# Patient Record
Sex: FEMALE | Race: WHITE | URBAN - METROPOLITAN AREA
[De-identification: names, ages, dates, MRNs, and addresses within clinical notes are randomized per-mention and may not be internally consistent; named-entity substitution may affect disease eponyms.]

---

## 2017-02-02 NOTE — PATIENT DISCUSSION
8 17 16 NO SIG VASCULITIS OTHER THAN SMALL NEW CWS ALONG INF ARCADE OD, PT ^ PREDNISONE AS RHEUMATOLOGIST SAID INFLAATION LEVEL WENT UP.

## 2017-02-02 NOTE — PATIENT DISCUSSION
6 15 16 MILD VIT INFLAMATION OS BUT NO VITRITIOUS. ON METHOTREXATE. HAS BEEN STABLE.   REDUCE PREDNISONE TO 5 EVERY OTHER DAY AND CONSIDER STOPPING IF DOING OK ON FU

## 2017-02-02 NOTE — PATIENT DISCUSSION
NO ACTIVE VASCULITIS SEEN TODAY - CONT VS CONT METHOTREXATE AND PREDNISONE - IN PAST HAS RECURRED WHEN MEDS WERE REDUCED - DISCUSSED F/U WITH DR GILLIS AND PT TO CONSIDER

## 2017-02-02 NOTE — PATIENT DISCUSSION
DAN-Ref: Within NEXT AVAILABLE , Phone # [phone] ), Secondary Phone # ( )-( )-( ), Type of referral: NEW PATIENT , Comments:  CHANGES WITH HER GLAUCOMA. End DAN-Ref.

## 2017-02-02 NOTE — PATIENT DISCUSSION
MAY BE 2ND AMD/RETINAL CHANGES.   RECOM F/U DR Nelsy Garay TO SEE IF A CHANGE IN GLASSES WOULD HELP - HAS NOT SEEN

## 2017-05-02 NOTE — PATIENT DISCUSSION
6 15 16 MILD VIT INFLAMATION OS BUT NO VITRITIOUS. ON METHOTREXATE. HAS BEEN STABLE. REDUCE PREDNISONE TO 5 EVERY OTHER DAY AND CONSIDER STOPPING IF DOING OK ON FU.

## 2017-05-02 NOTE — PATIENT DISCUSSION
MAY BE 2ND AMD/RETINAL CHANGES. RECOM F/U DR Bennie Torrez TO SEE IF A CHANGE IN GLASSES WOULD HELP - HAS NOT SEEN.

## 2017-05-02 NOTE — PATIENT DISCUSSION
DAN-Ref: Within NEXT AVAILABLE , Phone # [phone] ), Secondary Phone # ( )-( )-( ), Type of referral: NEW PATIENT , Comments:  CHANGES WITH HER GLAUCOMA. End DAN-Ref.

## 2017-05-02 NOTE — PATIENT DISCUSSION
NO ACTIVE VASCULITIS SEEN - NO UVEITIS - NO CELLS  - CONT METHOTREXATE AND PREDNISONE - IN PAST HAS RECURRED WHEN MEDS WERE REDUCED - DISCUSSED F/U WITH DR GILLIS AND PT TO CONSIDER.

## 2017-06-08 ENCOUNTER — IMPORTED ENCOUNTER (OUTPATIENT)
Dept: URBAN - METROPOLITAN AREA CLINIC 50 | Facility: CLINIC | Age: 82
End: 2017-06-08

## 2017-06-12 ENCOUNTER — IMPORTED ENCOUNTER (OUTPATIENT)
Dept: URBAN - METROPOLITAN AREA CLINIC 50 | Facility: CLINIC | Age: 82
End: 2017-06-12

## 2017-06-14 ENCOUNTER — IMPORTED ENCOUNTER (OUTPATIENT)
Dept: URBAN - METROPOLITAN AREA CLINIC 50 | Facility: CLINIC | Age: 82
End: 2017-06-14

## 2017-07-05 ENCOUNTER — IMPORTED ENCOUNTER (OUTPATIENT)
Dept: URBAN - METROPOLITAN AREA CLINIC 50 | Facility: CLINIC | Age: 82
End: 2017-07-05

## 2017-08-02 ENCOUNTER — IMPORTED ENCOUNTER (OUTPATIENT)
Dept: URBAN - METROPOLITAN AREA CLINIC 50 | Facility: CLINIC | Age: 82
End: 2017-08-02

## 2017-08-08 NOTE — PATIENT DISCUSSION
MAY BE 2ND AMD/RETINAL CHANGES. RECOM F/U DR Kayleen Cagle TO SEE IF A CHANGE IN GLASSES WOULD HELP - HAS NOT SEEN.

## 2017-08-08 NOTE — PATIENT DISCUSSION
DAN-Ref: Within NEXT AVAILABLE , Phone # [phone] ), Secondary Phone # ( )-( )-( ), Type of referral: NEW PATIENT , Comments:  CHANGES WITH HER GLAUCOMA. End DAN-Ref.

## 2017-09-18 ENCOUNTER — IMPORTED ENCOUNTER (OUTPATIENT)
Dept: URBAN - METROPOLITAN AREA CLINIC 50 | Facility: CLINIC | Age: 82
End: 2017-09-18

## 2017-11-07 NOTE — PATIENT DISCUSSION
MAY BE 2ND AMD/RETINAL CHANGES. RECOM F/U DR Hilton Erazo TO SEE IF A CHANGE IN GLASSES WOULD HELP - HAS NOT SEEN.

## 2017-11-07 NOTE — PATIENT DISCUSSION
DAN-Ref: Within NEXT AVAILABLE , Phone # [phone] ), Secondary Phone # ( )-( )-( ), Type of referral: NEW PATIENT , Comments:  CHANGES WITH HER GLAUCOMA. End DAN-Ref.

## 2017-12-05 NOTE — PATIENT DISCUSSION
MAY BE 2ND AMD/RETINAL CHANGES. RECOM F/U DR Noble Ruiz TO SEE IF A CHANGE IN GLASSES WOULD HELP - HAS NOT SEEN.

## 2017-12-05 NOTE — PATIENT DISCUSSION
DAN-Ref: Within NEXT AVAILABLE , Phone # [phone] ), Secondary Phone # ( )-( )-( ), Type of referral: NEW PATIENT , Comments:  CHANGES WITH HER GLAUCOMA. End DAN-Ref.

## 2018-02-06 NOTE — PATIENT DISCUSSION
HOWEVER CAT IS PROM AND I THINK THE POTENTIAL BENEFITS AT THIS POINT OUTWEIGH THE RISK AS IT IS GETTING WORSE -.

## 2018-02-06 NOTE — PATIENT DISCUSSION
DAN-Ref: Within NEXT AVAILABLE , Phone # [phone] ), Secondary Phone # ( )-( )-( ), Type of referral: NEW PATIENT , Comments:  CHANGES WITH HER GLAUCOMA. End DAN-Ref.

## 2018-02-06 NOTE — PATIENT DISCUSSION
Cataract APPEARS VISUALLY SIGNIFICANT and patient advised to SEE DR LY for evaluation and treatment.  WOULD RECOM STARTING PF/NON STEROIDALS 3 DAYS PRIOR TO CAT SURGERY TO TRY AND REDUCE POST OP INFLAMMATION AND TO HELP STABILIZE RETINA.

## 2018-02-06 NOTE — PATIENT DISCUSSION
MAY BE 2ND AMD/RETINAL CHANGES. RECOM F/U DR Cornelia Sam TO SEE IF A CHANGE IN GLASSES WOULD HELP - HAS NOT SEEN.

## 2018-03-12 ENCOUNTER — IMPORTED ENCOUNTER (OUTPATIENT)
Dept: URBAN - METROPOLITAN AREA CLINIC 50 | Facility: CLINIC | Age: 83
End: 2018-03-12

## 2018-05-08 NOTE — PATIENT DISCUSSION
DAN-Ref: Within NEXT AVAILABLE , Phone # [phone] ), Secondary Phone # ( )-( )-( ), Type of referral: NEW PATIENT , Comments:  CHANGES WITH HER GLAUCOMA. End DAN-Ref.

## 2018-08-20 NOTE — PATIENT DISCUSSION
DAN-Ref: Within NEXT AVAILABLE , Phone # [phone] ), Secondary Phone # ( )-( )-( ), Type of referral: NEW PATIENT , Comments:  CHANGES WITH HER GLAUCOMA. End DAN-Ref.

## 2018-10-22 ENCOUNTER — IMPORTED ENCOUNTER (OUTPATIENT)
Dept: URBAN - METROPOLITAN AREA CLINIC 50 | Facility: CLINIC | Age: 83
End: 2018-10-22

## 2018-12-05 NOTE — PATIENT DISCUSSION
DAN-Ref: Within NEXT AVAILABLE , Phone # [phone]-([phone] ), Secondary Phone # ( )-( )-( ), Type of referral: NEW PATIENT , Comments:  CHANGES WITH HER GLAUCOMA. End DAN-Ref.

## 2019-03-06 NOTE — PATIENT DISCUSSION
PT WENT TO SEE DR GILLIS - BUT THERE WAS A PROBLEM AND SHE DID NOT SEE DR GILLIS - PT SAW DR GILLIS FELLOW.

## 2019-03-28 NOTE — PATIENT DISCUSSION
DAN-Ref: Within NEXT AVAILABLE , Phone # [phone]-([phone] ), Secondary Phone # ( )-( )-( ), Type of referral: NEW PATIENT , Comments:  CHANGES WITH HER GLAUCOMA. End DAN-Ref. He has risk factors.- he needs a stress echo but his achilles tendon is bad and this secondary to Cipro     The 10-year ASCVD risk score (Hao HASTINGS Jr., et al., 2013) is: 43.8%    Values used to calculate the score:      Age: 72 years      Sex: Male      Is Non- : No      Diabetic: Yes      Tobacco smoker: No      Systolic Blood Pressure: 127 mmHg      Is BP treated: Yes      HDL Cholesterol: 30 mg/dL      Total Cholesterol: 159 mg/dL

## 2019-07-01 NOTE — PATIENT DISCUSSION
DAN-Ref: Within NEXT AVAILABLE , Phone # [phone]-([phone] ), Secondary Phone # ( )-( )-( ), Type of referral: NEW PATIENT , Comments:  CHANGES WITH HER GLAUCOMA. End DAN-Ref.

## 2019-10-07 NOTE — PATIENT DISCUSSION
NO ACTIVE INFLAMMATION. bilateral upper extremity ROM was WFL (within functional limits)/bilateral lower extremity ROM was WFL (within functional limits)

## 2019-10-08 NOTE — PATIENT DISCUSSION
DAN-Ref: Within NEXT AVAILABLE , Phone # [phone]-([phone] ), Secondary Phone # ( )-( )-( ), Type of referral: NEW PATIENT , Comments:  CHANGES WITH HER GLAUCOMA. End DAN-Ref.

## 2020-01-07 NOTE — PATIENT DISCUSSION
DAN-Ref: Within NEXT AVAILABLE , Phone # [phone]-([phone] ), Secondary Phone # ( )-( )-( ), Type of referral: NEW PATIENT , Comments:  CHANGES WITH HER GLAUCOMA. End DAN-Ref.

## 2020-07-13 NOTE — PATIENT DISCUSSION
DAN-Ref: Within NEXT AVAILABLE , Phone # [phone]-([phone] ), Secondary Phone # ( )-( )-( ), Type of referral: NEW PATIENT , Comments:  CHANGES WITH HER GLAUCOMA. End DAN-Ref.

## 2020-07-13 NOTE — PATIENT DISCUSSION
NO VASCULITIS SEEN - TO SEE DR Kwesi Larios IN 1-2 WEEKS FOR EVALUATION AND TO SEE IF SHE CAN STOP HER PREDNISONE - AND FOR HER ONGOING F/U AND CARE.

## 2020-07-13 NOTE — PATIENT DISCUSSION
STOPPED MTX SUMMER 2018 - ON PREDNISIONE - BP TOLD HER TO GO 4 MG - CONT 4 MG TILL SHE SEED DR Ruthie Henriquez.

## 2020-07-29 NOTE — PATIENT DISCUSSION
DAN-Ref: Within NEXT AVAILABLE , Phone # [phone]-([phone] ), Secondary Phone # ( )-( )-( ), Type of referral: NEW PATIENT , Comments:  CHANGES WITH HER GLAUCOMA. End DAN-Ref.

## 2020-07-29 NOTE — PATIENT DISCUSSION
NO VASCULITIS SEEN - TO SEE DR Any Fairbanks IN 1-2 WEEKS FOR EVALUATION AND TO SEE IF SHE CAN STOP HER PREDNISONE - AND FOR HER ONGOING F/U AND CARE.

## 2020-07-29 NOTE — PATIENT DISCUSSION
7/29/20: FIRST TIME NOTICED AFTER THE VISIT IN 2/18. UNCHANGED SINCE THEN ON FA AND CLINICAL FP. NO SIGNS OF ACTIVITY. LIKELY RELATED TO RA AND/OR POSTERIOR SCLERITIS 2ND TO RA. WILL DO INDOMETHACIN 75 MG BID AND REASSESS IN 4 WKS WITH ICG TO EVAL CHOROID.

## 2020-07-29 NOTE — PATIENT DISCUSSION
STOPPED MTX SUMMER 2018 - ON PREDNISIONE - BP TOLD HER TO GO 4 MG - CONT 4 MG TILL SHE SEED DR Jen Abdalla.

## 2020-08-26 NOTE — PATIENT DISCUSSION
8/26/20: VISION DROPPED. TO SEE DR TOVAR WITH HVF TO EVAL IF VISION IS RELATED TO GLACUOMA PROGRESSION?

## 2020-08-26 NOTE — PATIENT DISCUSSION
STOPPED MTX SUMMER 2018 - ON PREDNISIONE - BP TOLD HER TO GO 4 MG - CONT 4 MG TILL SHE SEED DR Vickie Ansari.

## 2020-08-26 NOTE — PATIENT DISCUSSION
DAN-Ref: Within NEXT AVAILABLE , Phone # [phone]-([phone] ), Secondary Phone # ( )-( )-( ), Type of referral: NEW PATIENT , Comments:  CHANGES WITH HER GLAUCOMA. End DAN-Ref.

## 2020-08-26 NOTE — PATIENT DISCUSSION
RECURRENT OFF PF 11 7 17 RESUME PF QID X 2 WKS THEN REDUCE TO TID AND ADD PROLENSA QD. Bill For Surgical Tray: no Lab: [id number] [facility] [address] Notification Instructions: Patient will be notified of biopsy results. However, patient instructed to call the office if not contacted within 2 weeks. Curettage Text: The wound bed was treated with curettage after the biopsy was performed. Hemostasis: Electrocautery Biopsy Method: Personna blade Detail Level: Detailed Additional Anesthesia Volume In Cc (Will Not Render If 0): 0 Wound Care: Vaseline Cryotherapy Text: The wound bed was treated with cryotherapy after the biopsy was performed. Consent: The provider's intent is to obtain a tissue sample solely for diagnostic purposes. Written consent to obtain tissue sample was obtained and risks were reviewed including but not limited to scarring, infection, bleeding, scabbing, incomplete removal, nerve damage and allergy to anesthesia. Anesthesia Type: 1% lidocaine without epinephrine Lab Facility: [id number] Anesthesia Volume In Cc (Will Not Render If 0): 0.5 Electrodesiccation Text: The wound bed was treated with electrodesiccation after the biopsy was performed. Depth Of Biopsy: dermis Electrodesiccation And Curettage Text: The wound bed was treated with electrodesiccation and curettage after the biopsy was performed. Billing Type: [employer] Type Of Destruction Used: Curettage Biopsy Type: H and E Body Location Override (Optional - Billing Will Still Be Based On Selected Body Map Location If Applicable): posterior neck Was A Bandage Applied: Yes Silver Nitrate Text: The wound bed was treated with silver nitrate after the biopsy was performed. Post-Care Instructions: I reviewed with the patient in detail post-care instructions. Patient is to keep the biopsy site dry overnight, and then apply bacitracin twice daily until healed. Patient may apply hydrogen peroxide soaks to remove any crusting. Dressing: bandage

## 2020-08-26 NOTE — PATIENT DISCUSSION
NO VASCULITIS SEEN - TO SEE DR Francesca Wagner IN 1-2 WEEKS FOR EVALUATION AND TO SEE IF SHE CAN STOP HER PREDNISONE - AND FOR HER ONGOING F/U AND CARE.

## 2020-08-26 NOTE — PATIENT DISCUSSION
8/26/20Alberteen Presto SINCE 2018. Persistent THICKENING OF CHOROID, POSSIBLE RELATIONSHIP WITH HYPOTONY AFTER TRAB. CHRONICITY MAKES IMPROVEMENTE LESS LIKELY, BUT POSS. NO SIGNS OF SCLERITIS SEEN TODAY, HOWEVER GIVEN PERSISTENT THCIKENING, WILL DO STEROIDS TO EVAL IF ANY IMPROVEMENT IS SEEN.

## 2020-09-23 NOTE — PATIENT DISCUSSION
STOPPED MTX SUMMER 2018 - ON PREDNISIONE - BP TOLD HER TO GO 4 MG - CONT 4 MG TILL SHE SEED DR Deana Flood.

## 2020-09-23 NOTE — PATIENT DISCUSSION
Call to dad, ok per patient to speak with. IUD inserted 1/8 patient having cramping and spotting after insertion and wants to know if it is normal and what to do for it.  Discussed irregular spotting and cramps for up to 6 months after insertion. Patient to be see for IUD check 1 month after insertion.    Recommended weight and BMI control through healthy diet and exercise, green leafy veggies, UV protection, and not smoking. Reviewed the benefits of AREDS II VITAMINS VERUS GENOTYPE directed vitamin therapy, and recommended following one or the other to try and prevent the progression of the disease. Reviewed the importance of daily monitoring of the vision in each eye independently, along with the use of the Amsler grid daily and instructed patient to call and return immediately for any new changes in their vision or on the Amsler grid. 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

## 2020-09-23 NOTE — PATIENT DISCUSSION
9/23/20: [facility] IMPROVED WITH PREDNISONE PO. WILL START TAPERING TO 40MG/D UNTIL NEXT APPT IN 1MO. IF OK, WILL CONT TAPERING 10MG/2WKS.

## 2020-09-23 NOTE — PATIENT DISCUSSION
8/26/20Jaden Spaulding SINCE 2018. Persistent THICKENING OF CHOROID, POSSIBLE RELATIONSHIP WITH HYPOTONY AFTER TRAB. CHRONICITY MAKES IMPROVEMENTE LESS LIKELY, BUT POSS. NO SIGNS OF SCLERITIS SEEN TODAY, HOWEVER GIVEN PERSISTENT THCIKENING, WILL DO STEROIDS TO EVAL IF ANY IMPROVEMENT IS SEEN.

## 2020-09-23 NOTE — PATIENT DISCUSSION
NO VASCULITIS SEEN - TO SEE DR Medina Ricci IN 1-2 WEEKS FOR EVALUATION AND TO SEE IF SHE CAN STOP HER PREDNISONE - AND FOR HER ONGOING F/U AND CARE.

## 2020-09-23 NOTE — PATIENT DISCUSSION
DAN-Ref: Within NEXT AVAILABLE , Phone # [phone]-([phone] ), Secondary Phone # ( )-( )-( ), Type of referral: NEW PATIENT , Comments:  CHANGES WITH HER GLAUCOMA. End DAN-Ref.

## 2020-10-08 NOTE — PATIENT DISCUSSION
NEW 1 7 20 WITH CHOROIDAL THICKENING ON B SCAN. October 8, 2020      Guy J. Lefort, MD  [address] [facility] [address]           [facility]  [address], [place]   [facility] [address]  Phone: [phone]  Fax: [phone]          Patient: Josefina Martin   MR Number: [id number]   YOB: 1974   Date of Visit: 10/8/2020       Dear Dr. Guy J. Lefort:    Thank you for referring Josefina Martin to me for evaluation. Attached you will find relevant portions of my assessment and plan of care.    If you have questions, please do not hesitate to call me. I look forward to following Josefina Martin along with you.    Sincerely,    Lucas Worthington, JUN    Enclosure  CC:  No Recipients    If you would like to receive this communication electronically, please contact [email or web address] or [phone] to request more information on [employer] Link access.    For providers and/or their staff who would like to refer a patient to [facility], please contact us through our one-stop-shop provider referral line, [facility], at [phone].    If you feel you have received this communication in error or would no longer like to receive these types of communications, please e-mail [email or web address]

## 2020-10-28 NOTE — PATIENT DISCUSSION
NO VASCULITIS SEEN - TO SEE DR Erwin Weinstein IN 1-2 WEEKS FOR EVALUATION AND TO SEE IF SHE CAN STOP HER PREDNISONE - AND FOR HER ONGOING F/U AND CARE.

## 2020-10-28 NOTE — PATIENT DISCUSSION
STOPPED MTX SUMMER 2018 - ON PREDNISIONE - BP TOLD HER TO GO 4 MG - CONT 4 MG TILL SHE SEED DR Erwin Weinstein.

## 2020-10-28 NOTE — PATIENT DISCUSSION
10/28/20: STABLE VA BUT NO IMPROVED. NO INFLAMMATION SEEN TODAY. WILL CONTINUE TAPERING PREDNISONE AS PLANNED. CONT PF QD FOR ANOTHER MONTH. DEPENDING ON HVF, WE MAY INJECT AVASTIN TO EVAL RESPONSE.

## 2020-10-28 NOTE — PATIENT DISCUSSION
My findings and recommendations are based on the patient's symptoms, exam, diagnostic testing and records. [facility]  Progress Note    Patient: Annabel Cheadle MRN: [id number]   SSN: [id number]  YOB: 1959   Age: [address] y.o. Sex: female      Admit Date: 8/7/2020    LOS: 4 days   Chief Complaint   Patient presents with    Other     Covid positive    Shortness of Breath       Subjective:     No acute events overnight. This morning she says she is fine, had a restful night's sleep on BiPAP. She continues to cough, but hasn't produced any mucus yesterday. On 2-3L O2, denies any SOB at rest. Continues to have the same chest tightness/'soreness' wrapping around her chest worse on the R flank. She thinks it is because her pleura is irritated from coughing too hard. Walked to the bathroom and back multiple times yesterday, felt lightheaded and SOB but that has been her baseline. BM yesterday, normal.     She denied any chest pain, palpitations, fever/chills, sore throat, abdominal pain. She thinks her breathing is back to her baseline now. ROS as noted above    Objective:     Visit Vitals  /74   Pulse 91   Temp 97.6 °F (36.4 °C)   Resp 26   Ht 5' 3\" (1.6 m)   Wt 121.1 kg (267 lb)   SpO2 93%   BMI 47.30 kg/m²       Physical Exam:  General: chronically ill-appearnig, NAD, alert, friendly and communicative  HEENT: EOMI, mouth moist and pink  CV:  RRR, no M/G/R  RESP: Non labored breathing on BiPAP. Decreased BS diffusely, diffuse expiratory wheezes heard bilaterally about the same as yesterday. ABD:  Soft. umbilical hernia TTP but reducible, Normoactive bowel sounds. MS:  No joint deformity or instability. Neuro: A+Ox3. Ext:  Trace edema in the LE bilaterally. 2+ radial and dp pulses bilaterally. Sensation intact in the LE bilaterally  Skin: warm, dry   Psych: normal mood and affect       Intake and Output:  Current Shift: No intake/output data recorded.   Last three shifts: 08/09 1901 - 08/11 0700  In: 500 [I.V.:500]  Out: 300 [Urine:300]    Lab/Data Review:  Recent Results (from the past 12 hour(s))   GLUCOSE, POC    Collection Time: 08/10/20  9:38 PM   Result Value Ref Range    Glucose (POC) 208 (H) 70 - 110 mg/dL   CBC WITH AUTOMATED DIFF    Collection Time: 08/11/20  4:33 AM   Result Value Ref Range    WBC 9.0 4.6 - 13.2 K/uL    RBC 3.86 (L) 4.20 - 5.30 M/uL    HGB 11.8 (L) 12.0 - 16.0 g/dL    HCT 36.1 35.0 - 45.0 %    MCV 93.5 74.0 - 97.0 FL    MCH 30.6 24.0 - 34.0 PG    MCHC 32.7 31.0 - 37.0 g/dL    RDW 14.2 11.6 - 14.5 %    PLATELET 555 791 - 615 K/uL    MPV 9.2 9.2 - 11.8 FL    NEUTROPHILS 66 40 - 73 %    LYMPHOCYTES 26 21 - 52 %    MONOCYTES 8 3 - 10 %    EOSINOPHILS 0 0 - 5 %    BASOPHILS 0 0 - 2 %    ABS. NEUTROPHILS 5.9 1.8 - 8.0 K/UL    ABS. LYMPHOCYTES 2.4 0.9 - 3.6 K/UL    ABS. MONOCYTES 0.8 0.05 - 1.2 K/UL    ABS. EOSINOPHILS 0.0 0.0 - 0.4 K/UL    ABS. BASOPHILS 0.0 0.0 - 0.1 K/UL    DF AUTOMATED     LACTIC ACID    Collection Time: 08/11/20  4:33 AM   Result Value Ref Range    Lactic acid 1.6 0.4 - 2.0 MMOL/L   METABOLIC PANEL, BASIC    Collection Time: 08/11/20  4:33 AM   Result Value Ref Range    Sodium 138 136 - 145 mmol/L    Potassium 3.4 (L) 3.5 - 5.5 mmol/L    Chloride 99 (L) 100 - 111 mmol/L    CO2 34 (H) 21 - 32 mmol/L    Anion gap 5 3.0 - 18 mmol/L    Glucose 92 74 - 99 mg/dL    BUN 22 (H) 7.0 - 18 MG/DL    Creatinine 0.93 0.6 - 1.3 MG/DL    BUN/Creatinine ratio 24 (H) 12 - 20      GFR est AA >60 >60 ml/min/1.73m2    GFR est non-AA >60 >60 ml/min/1.73m2    Calcium 10.1 8.5 - 10.1 MG/DL       RECENT RESULTS  MODALITY IMPRESSION   XR Results from [place] encounter on 08/07/20   XR CHEST PORT    Narrative EXAM: XR CHEST PORT    INDICATION: 61 years Female. sob/covid. ADDITIONAL HISTORY: None. TECHNIQUE: Frontal view of the chest.    COMPARISON: 7/27/2020    FINDINGS:    The cardiac silhouette is within normal limits in size.     There is linear opacities in the peripheral aspect of the bilateral mid and  lower lung zones which are less pronounced than prior. No definite evidence of pleural effusion. No evidence of pneumothorax. Tethering  of the left lateral hemidiaphragm. Osseous structures are unchanged in appearance. Impression IMPRESSION:  Interval improved linear peripheral opacities in the bilateral mid and lower  lung zones, suggesting of scarring although persistent infiltrates are not  excluded. Recommend continued imaging follow-up. CT Results from [place] encounter on 07/30/20   CT ABD PELV WO CONT    Addendum Addendum: Addendum:  As mentioned in the initial report but not in impression are extensive peripheral reticular and fibrotic opacities in imaged bilateral lower lung  new since the CT in 4/20 and concerning subacute interstitial pneumonitis. Clinical correlation and chest CT evaluation advised. Lebron Ellis MD 7/30/2020 10:38 AM          Narrative CT of abdomen and pelvis without contrast     INDICATION: Increasing pain at the ventral hernia    COMPARISON: CT 4/8/20    TECHNIQUE: 5 mm helical scan to the abdomen and pelvis is obtained  from the  diaphragm to the symphysis pubis without  IV contrast administration. All CT scans at this facility performed using dose optimization techniques as  appreciated to a performed exam, to include automated exposure control,  adjustment of the mA and or KU according to patient size (including appropriate  matching for site specific examination), or use of iterative reconstruction  technique. FINDINGS: The study is suboptimal due to lack of IV contrast.  Subtle  abnormality can be under detected. Lung Bases: Moderate streaky and reticular opacities identified in imaged  bilateral lower lobes, new since the prior study. Atelectasis in right middle  lobe along the fissure. Prominent pericardial fat pad. Liver: Normal.    Gallbladder: Surgically absent. Biliary System: No ductal dilatation.     Spleen: Normal.    Pancreas: Normal.    Adrenal Glands: Normal.    Kidneys: Normal.    Bowel: The stomach is filled with food content within no definite abnormality. The small bowel is mildly dilated with air-fluid levels as well as mild mucosal  thickening, especially in the jejunum. The dilatation is extending to the  terminal ileum with no definite transit point. The colon is nondilated with  scattered diverticula throughout. No diverticulitis. The periumbilical hernia is slightly decreased in size from 6.7 x 7.2 cm to 5.8  x 6.3 cm. The small bowel loop within the hernia sac is no longer evident  instead is abutting the hernia. Although, the mesenteric fat within the hernia  sac appears inflamed. Mild hernia wall thickening and minimal fluid appear  unchanged. Lower genitourinary system: The bladder is poorly distended. The uterus is  surgically absent. Peritoneum/Retroperitoneum: No adenopathy. Vasculature: Moderate atherosclerotic aortic disease. Other: No free fluid. CT OSSEOUS STRUCTURES:       Unremarkable for age. Impression IMPRESSION:     1. The ventral hernia is slightly smaller and the small bowel loop is no longer  seen in the hernia sac but abutting the hernia neck compared to the prior CT in  4/20. The mesenteric within the hernia sac becomes strained, suggesting fatty  inflammation/ischemia. 2. Interval development of mild small bowel dilatation with air-fluid level and  mild mural thickening all the way to the terminal ileum with no evidence of  transit point. The finding could represent enteritis or adynamic ileus. Recommend clinical correlation. 3. Diverticulosis coli. Thank you for this referral.       MRI No results found for this or any previous visit. ULTRASOUND Results from [place] encounter on 04/10/17   US ABD COMP    Impression IMPRESSION:       1. Echogenic mildly enlarged liver, suggestive of hepatic steatosis. No focal  hepatic lesion identified. 2. Status post cholecystectomy.  No biliary ductal dilatation. Cardiology Procedures/Testing:  MODALITY RESULTS   EKG Results for orders placed or performed during the hospital encounter of 08/07/20   EKG, 12 LEAD, INITIAL   Result Value Ref Range    Ventricular Rate 80 BPM    Atrial Rate 80 BPM    P-R Interval 142 ms    QRS Duration 90 ms    Q-T Interval 378 ms    QTC Calculation (Bezet) 435 ms    Calculated P Axis 115 degrees    Calculated T Axis 108 degrees    Diagnosis       Sinus rhythm with premature atrial complexes  Low voltage QRS  Cannot rule out Anterior infarct (cited on or before 22-JUL-2020)  Nonspecific T wave abnormality  Abnormal ECG  When compared with ECG of 07-AUG-2020 15:24,  premature atrial complexes are now present  Confirmed by Gwen Chin ([id number]) on 8/9/2020 10:00:13 AM         ECHO 05/21/20   ECHO ADULT COMPLETE 05/25/2020 5/25/2020    Narrative · Image quality for this study was technically difficult. Contrast used:   DEFINITY. · Normal cavity size and systolic function (ejection fraction normal). Moderate concentric hypertrophy. Estimated left ventricular ejection   fraction is 65 - 70%. Visually measured ejection fraction. No regional   wall motion abnormality noted. Moderate (grade 2) left ventricular   diastolic dysfunction. · Mildly dilated left atrium. · Pulmonary hypertension. Pulmonary arterial systolic pressure is 61 mmHg. · Severely elevated central venous pressure (15+ mmHg); IVC diameter is   larger than 21 mm and collapses less than 50% with respiration.         Signed by: Jayant Caicedo MD        Special Testing/Procedures:  MODALITY RESULTS   MICRO All Micro Results     Procedure Component Value Units Date/Time    CULTURE, BLOOD [[id number]] Collected:  08/07/20 1525    Order Status:  Completed Specimen:  Blood Updated:  08/11/20 0727     Special Requests: NO SPECIAL REQUESTS        Culture result: NO GROWTH 4 DAYS       CULTURE, BLOOD [[id number]] Collected:  08/07/20 1510    Order Status:  Completed Specimen:  Blood Updated:  08/11/20 0727     Special Requests: NO SPECIAL REQUESTS        Culture result: NO GROWTH 4 DAYS       CULTURE, RESPIRATORY/SPUTUM/BRONCH Bossman Ramirez [[id number]]     Order Status:  Sent Specimen:  Sputum     CULTURE, RESPIRATORY/SPUTUM/BRONCH Bossman Ramirez [[id number]] Collected:  08/07/20 2115    Order Status:  Canceled Specimen:  Sputum          UA No results found for this or any previous visit. Telemetry yes   Oxygen NC/BiPAP     Assessment and Plan:     61 y.o. female with PMH of COPD on home O2 (3L NC), IDDM2, HTN, HFpEF, SHERRIE on CPAP, GERD, allergic rhinitis, recent prior admission for COVID pneumonia coupled with acute on chronic hypoxic respiratory failure, now admitted with worsening dyspnea.     Acute COPD Exacerbation/Asthma overlap syndrome. Improving, at baseline. Pt w/ hx of positive COVID swab (7/7, 7/22), but negative on admission (8/7). S/p Remdesivir and Convalescnet plasma. Patient has COPD on max therapy w/ strong asthma component. Hospitalized 6x in 2020 for COPD exacerbations. Possibly had a superimposed lung infection given elevated lactic acid, green sputum. No leukocytosis, although neutrophils 90%. Pending sputum culture, is on Levaquin, Zosyn. Continues to have chest tightness, but no cardiac concerns as troponins were negative and BNP is at baseline.  CXR 8/7 showed interval improved linear peripheral opacities in the bilateral mid and lower lung zones.   - Pulm following, appreciate recs  - Transfer to telemetry  - Sputum Culture  - Rapid COVID test was negative today, also was negative 8/7. Will discontinue droplet precautions. - Change Levaquin 750mg IV to PO.  - discontinue zosyn  - Prednisone PO.  Taper plan per pulm: 7 day course per Pulm rec 40qd x 7 days, 30qdx7 days, 20qd x 7 days, then 10qd x7 days  - duonebs q4 hours   - continue Advair and spiriva  - albuterol prn   - hold home bronchodilators   - hold home azithromycin M, W, F   - Daily CBC, BMP   - Lovenox 40 mg for DVT prophylaxis BID  - Supplemental oxygen with goal of 88-92%  - Vital signs per unit routine  - Monitor I/Os  - PT/OT already evaluated,appreciate input . Continue working with CM     Insulin dependent Type 2 Diabetes  Home lantus 45u pm and novolog SSI  -Continue Lantus at 55 U; BG has been in 200-280s, but steroids also contribute to hyperglycemia  -SSI   -Accuchecks ACHS  -Diabetic diet  -Diabetic educator seen yesterday, appreciate recs.     Hypertension, HFpEF: ECHO (5/24/20) TQ 30 - 70%. No regional wall motion abnormality. Moderate (grade 2) left ventricular diastolic dysfunction. Mildly dilated left atrium. Pulmonary hypertension. Pulmonary arterial systolic pressure is 61 mmHg. Severely elevated central venous pressure (15+ mmHg); IVC diameter is larger than 21 mm and collapses less than 50% with respiration. SBP in ED elevated  to 129s-139s.  Pt on lisinopril 20mg BID and HCTZ 12.5mg daily at home.  - Continue Lasix 20mg daily  - Continue Lisinopril 20mg BID  - Continue HCTZ 12.5 mg daily     GERD: Currently asymptomatic   -Continue omeprazole 40mg daily     Morbid obesity  -Diabetic diet       Diet diabetic   DVT Prophylaxis lovenox   GI Prophylaxis omeprazole   Code status Full   Disposition Home with [address] of Contact Nelly Bah  Relationship: Daughter  [phone]     Samira Goodman MD, PGY1   [address]   Intern Pager: [phone]   August 11, 2020, 11:15 AM

## 2020-10-28 NOTE — PATIENT DISCUSSION
8/26/20Clover Siegel SINCE 2018. Persistent THICKENING OF CHOROID, POSSIBLE RELATIONSHIP WITH HYPOTONY AFTER TRAB. CHRONICITY MAKES IMPROVEMENTE LESS LIKELY, BUT POSS. NO SIGNS OF SCLERITIS SEEN TODAY, HOWEVER GIVEN PERSISTENT THCIKENING, WILL DO STEROIDS TO EVAL IF ANY IMPROVEMENT IS SEEN.

## 2020-10-28 NOTE — PATIENT DISCUSSION
DAN-Ref: Within NEXT AVAILABLE , Phone # [phone] ), Secondary Phone # ( )-( )-( ), Type of referral: NEW PATIENT , Comments:  CHANGES WITH HER GLAUCOMA. End DAN-Ref.

## 2020-11-25 NOTE — PATIENT DISCUSSION
DAN-Ref: Within NEXT AVAILABLE , Phone # [phone] ), Secondary Phone # ( )-( )-( ), Type of referral: NEW PATIENT , Comments:  CHANGES WITH HER GLAUCOMA. End DAN-Ref.

## 2020-11-25 NOTE — PATIENT DISCUSSION
Referred patient out with summary. [facility]     Referral Priority:   Routine     Referral Type:   Eval and Treat     Referral Reason:   Specialty Services Required     Requested Specialty:   Physical Therapy     Number of Visits Requested:   1    Sushant Ankle Brace     Patient was prescribed a Sushant Ankle Brace. The right ankle will require stabilization / immobilization from this semi-rigid / rigid orthosis to improve their function. The orthosis will assist in protecting the affected area, provide functional support and facilitate healing. Patient was instructed to progress ambulation weight bearing as tolerated in the device. The patient was educated and fit by a healthcare professional with expert knowledge and specialization in brace application while under the direct supervision of the treating physician. Verbal and written instructions for the use of and application of this item were provided. They were instructed to contact the office immediately should the brace result in increased pain, decreased sensation, increased swelling or worsening of the condition. Other Outside Imaging and Testing Personally Reviewed:               Assessment   Impression: . Encounter Diagnosis   Name Primary?     Grade 2 ankle sprain, right, initial encounter Yes              Plan:       Wean out of boot program to functional ankle brace over the next 10 days she can start weeks #1 for 5 days and progress as directed per handout  Formal course PT home exercise program  Local cryotherapy OTC NSAIDs decrease level of weightbearing activity as much as possible to control lower extremity swelling/ankle swelling  Consider MRI evaluation ankle symptoms persist show no improvement or worsen         Orders:        Orders Placed This Encounter   Procedures    [facility] PT - [facility]     Referral Priority:   Routine     Referral Type:   Eval and Treat     Referral Reason:   Specialty Services Required     Requested

## 2020-11-25 NOTE — PATIENT DISCUSSION
NO VASCULITIS SEEN - TO SEE DR Suzette Adams IN 1-2 WEEKS FOR EVALUATION AND TO SEE IF SHE CAN STOP HER PREDNISONE - AND FOR HER ONGOING F/U AND CARE.

## 2020-11-25 NOTE — PATIENT DISCUSSION
11/25/20: DENSER TODAY W STABLE VA. WILL CONT SAME TX AND RECHECK IN 4 WKS. LOW THRESHOLD FOR ERG IF VA DECREASE OF CHANGES ON OCT.

## 2020-11-25 NOTE — PATIENT DISCUSSION
STOPPED MTX SUMMER 2018 - ON PREDNISIONE - BP TOLD HER TO GO 4 MG - CONT 4 MG TILL SHE SEED DR Rhea Tejada.

## 2020-11-25 NOTE — PATIENT DISCUSSION
8/26/20Arely Montero SINCE 2018. Persistent THICKENING OF CHOROID, POSSIBLE RELATIONSHIP WITH HYPOTONY AFTER TRAB. CHRONICITY MAKES IMPROVEMENTE LESS LIKELY, BUT POSS. NO SIGNS OF SCLERITIS SEEN TODAY, HOWEVER GIVEN PERSISTENT THCIKENING, WILL DO STEROIDS TO EVAL IF ANY IMPROVEMENT IS SEEN.

## 2020-12-31 NOTE — PATIENT DISCUSSION
DAN-Ref: Within NEXT AVAILABLE , Phone # [phone] ), Secondary Phone # ( )-( )-( ), Type of referral: NEW PATIENT , Comments:  CHANGES WITH HER GLAUCOMA. End DAN-Ref.

## 2020-12-31 NOTE — PATIENT DISCUSSION
NO VASCULITIS SEEN - TO SEE DR Martha Bailey IN 1-2 WEEKS FOR EVALUATION AND TO SEE IF SHE CAN STOP HER PREDNISONE - AND FOR HER ONGOING F/U AND CARE.

## 2020-12-31 NOTE — PATIENT DISCUSSION
8/26/20Felicitas Yap SINCE 2018. Persistent THICKENING OF CHOROID, POSSIBLE RELATIONSHIP WITH HYPOTONY AFTER TRAB. CHRONICITY MAKES IMPROVEMENTE LESS LIKELY, BUT POSS. NO SIGNS OF SCLERITIS SEEN TODAY, HOWEVER GIVEN PERSISTENT THCIKENING, WILL DO STEROIDS TO EVAL IF ANY IMPROVEMENT IS SEEN. Pt has appt 10/5 for PHF with Dr. Dari Coy, I put in [facility] order for VN, PT/OT, LCSW, HHA. Please document in note that pt needs HHC. Please cosign HHC order and return message.  thanks

## 2020-12-31 NOTE — PATIENT DISCUSSION
STOPPED MTX SUMMER 2018 - ON PREDNISIONE - BP TOLD HER TO GO 4 MG - CONT 4 MG TILL SHE SEED DR Suzette Adams.

## 2021-03-16 NOTE — PATIENT DISCUSSION
DAN-Ref: Within NEXT AVAILABLE , Phone # [phone] ), Secondary Phone # ( )-( )-( ), Type of referral: NEW PATIENT , Comments:  CHANGES WITH HER GLAUCOMA. End DAN-Ref.

## 2021-03-16 NOTE — PATIENT DISCUSSION
STOPPED MTX SUMMER 2018 - ON PREDNISIONE - BP TOLD HER TO GO 4 MG - CONT 4 MG TILL SHE SEED DR Lauren Rollins.

## 2021-03-16 NOTE — PATIENT DISCUSSION
3/16/21: ERM appears BORDERLINE VISUALLY SIGNIFICANT, HOWEVER GIVEN GLAUCOMA, WE'LL MONITOR TO EVAL IF THERE IS NEED OF PPV.

## 2021-03-16 NOTE — PATIENT DISCUSSION
8/26/20Collette Pellant SINCE 2018. Persistent THICKENING OF CHOROID, POSSIBLE RELATIONSHIP WITH HYPOTONY AFTER TRAB. CHRONICITY MAKES IMPROVEMENTE LESS LIKELY, BUT POSS. NO SIGNS OF SCLERITIS SEEN TODAY, HOWEVER GIVEN PERSISTENT THCIKENING, WILL DO STEROIDS TO EVAL IF ANY IMPROVEMENT IS SEEN.

## 2021-03-16 NOTE — PATIENT DISCUSSION
NO VASCULITIS SEEN - TO SEE DR Shahnaz Glez IN 1-2 WEEKS FOR EVALUATION AND TO SEE IF SHE CAN STOP HER PREDNISONE - AND FOR HER ONGOING F/U AND CARE.

## 2021-04-17 ASSESSMENT — TONOMETRY
OD_IOP_MMHG: 19
OD_IOP_MMHG: 17
OD_IOP_MMHG: 12
OS_IOP_MMHG: 25
OS_IOP_MMHG: 18
OS_IOP_MMHG: 20
OS_IOP_MMHG: 21
OS_IOP_MMHG: 26
OS_IOP_MMHG: 17

## 2021-04-17 ASSESSMENT — PACHYMETRY
OD_CT_UM: 552
OS_CT_UM: 572
OD_CT_UM: 552
OS_CT_UM: 572
OD_CT_UM: 552
OS_CT_UM: 572
OD_CT_UM: 552
OS_CT_UM: 572

## 2021-04-17 ASSESSMENT — VISUAL ACUITY
OD_PH: 20/40
OS_CC: 20/40+1
OD_CC: 20/40
OS_CC: 20/50=
OD_CC: J1+@ 14 IN
OS_CC: J1
OS_CC: J1+@ 14 IN
OD_CC: 20/25
OS_CC: 20/50
OS_PH: 20/50
OD_CC: J1
OD_CC: 20/40

## 2021-04-19 NOTE — PATIENT DISCUSSION
CONTINUE TO MONITOR. From: Samanta Rich  To: True J Rodolfo  Sent: 4/18/2021 11:09 PM CDT  Subject: Other    This message is being sent by Zuleika Rich on behalf of Samanta Rich    Hi, this is Samanta's mother.   It is currently Sunday night, and Samanta (Peggy) is inconsolable. We have switched her medication schedule to be taken at night to avoid the potential gi upset because of her not wanting to eat first. Also, we did reduce her dosage to a half a tablet after our last conversation. Since school started back the 6th, I do believe stress and anxiety have perpetuated certain behaviors and I find myself growing concerned, unknowing how to move forward with her. She absolutely cannot avoid her responsibilities, but I don't want to make the mistake of pushing her if she is actually at her limit. I am however concerned she is attempting to manipulate the situation because school is difficult. She is very aware that if she is exhibiting certain symptoms that it must be reported and she can't return back to school because of Covid restrictions. I am very concerned.     We were unable to secure an appointment with the clinic you suggested as it was out of our network. We tried to get out of network information regarding cost and coverage but the woman we spoke with was unable to get any cooperation from our insurance company.     I do appreciate your time and your expertise. I am so concerned about my child , but want to parent as necessary, as healthy, as possible.   Sincerely,  Mrs. Rich

## 2021-04-21 NOTE — PATIENT DISCUSSION
STOPPED MTX SUMMER 2018 - ON PREDNISIONE - BP TOLD HER TO GO 4 MG - CONT 4 MG TILL SHE SEED DR Gwen Clifford.

## 2021-04-21 NOTE — PATIENT DISCUSSION
DAN-Ref: Within NEXT AVAILABLE , Phone # [phone] ), Secondary Phone # ( )-( )-( ), Type of referral: NEW PATIENT , Comments:  CHANGES WITH HER GLAUCOMA. End DAN-Ref.

## 2021-04-21 NOTE — PATIENT DISCUSSION
NO VASCULITIS SEEN - TO SEE DR Deana Flood IN 1-2 WEEKS FOR EVALUATION AND TO SEE IF SHE CAN STOP HER PREDNISONE - AND FOR HER ONGOING F/U AND CARE.

## 2021-04-21 NOTE — PATIENT DISCUSSION
8/26/20Dorrie Breath SINCE 2018. Persistent THICKENING OF CHOROID, POSSIBLE RELATIONSHIP WITH HYPOTONY AFTER TRAB. CHRONICITY MAKES IMPROVEMENTE LESS LIKELY, BUT POSS. NO SIGNS OF SCLERITIS SEEN TODAY, HOWEVER GIVEN PERSISTENT THCIKENING, WILL DO STEROIDS TO EVAL IF ANY IMPROVEMENT IS SEEN.

## 2021-05-14 NOTE — PATIENT DISCUSSION
5/14/21: LOWEST IOP MEASURED TODAY. PT CONCERNED. DISCUSSED THAT I'LL DISCUSS HER CASE W Becca Davila.  REINFORCED SHE'S IN [place] AND HE'LL TX AS NEEDED HER HYPOTONY OS,.

## 2021-05-14 NOTE — PATIENT DISCUSSION
5/14/21: ERM appears BORDERLINE VISUALLY SIGNIFICANT, HOWEVER GIVEN GLAUCOMA, WE'LL MONITOR TO EVAL IF THERE IS NEED OF PPV.

## 2021-05-14 NOTE — PATIENT DISCUSSION
8/26/20Perfecto Omar SINCE 2018. Persistent THICKENING OF CHOROID, POSSIBLE RELATIONSHIP WITH HYPOTONY AFTER TRAB. CHRONICITY MAKES IMPROVEMENTE LESS LIKELY, BUT POSS. NO SIGNS OF SCLERITIS SEEN TODAY, HOWEVER GIVEN PERSISTENT THCIKENING, WILL DO STEROIDS TO EVAL IF ANY IMPROVEMENT IS SEEN.

## 2021-05-14 NOTE — PATIENT DISCUSSION
NO VASCULITIS SEEN - TO SEE DR Christiano Briscoe IN 1-2 WEEKS FOR EVALUATION AND TO SEE IF SHE CAN STOP HER PREDNISONE - AND FOR HER ONGOING F/U AND CARE.

## 2021-05-14 NOTE — PATIENT DISCUSSION
DR Shahriar Valdes SUGGESTED PT TO GO TO [facility] FOR FURTHER MANAGEMENT OF IOP. DISCUSSED W PT I'LL TO TALK TO DR RESENDEZ.2:34 PM: SPOKE W DR Branden Lynn. PER HIS REPORT, IOP NEEDS TO BE CORRECTED BY 2 POINTS GIVEN PACHY. HE'LL CHECK HER NEXT WK AND TX AS NEEDED.

## 2021-06-08 NOTE — PATIENT DISCUSSION
CONTINUE F/U DR LY. Size Of Lesion In Cm (Optional): 0 Detail Level: Simple unk Detail Level: Detailed

## 2021-07-19 NOTE — PATIENT DISCUSSION
STOPPED MTX SUMMER 2018 - ON PREDNISIONE - BP TOLD HER TO GO 4 MG - CONT 4 MG TILL SHE SEED DR Alvarado Necessary.

## 2021-07-19 NOTE — PATIENT DISCUSSION
8/26/20Sandra Gonsalez SINCE 2018. Persistent THICKENING OF CHOROID, POSSIBLE RELATIONSHIP WITH HYPOTONY AFTER TRAB. CHRONICITY MAKES IMPROVEMENTE LESS LIKELY, BUT POSS. NO SIGNS OF SCLERITIS SEEN TODAY, HOWEVER GIVEN PERSISTENT THCIKENING, WILL DO STEROIDS TO EVAL IF ANY IMPROVEMENT IS SEEN.

## 2021-07-19 NOTE — PATIENT DISCUSSION
DR Hilton Erazo SUGGESTED PT TO GO TO [facility] FOR FURTHER MANAGEMENT OF IOP. DISCUSSED W PT I'LL TO TALK TO  PYRJZPCR.7:38 PM: SPOKE W DR Paresh Christian. PER HIS REPORT, IOP NEEDS TO BE CORRECTED BY 2 POINTS GIVEN PACHY. HE'LL CHECK HER NEXT WK AND TX AS NEEDED.

## 2021-07-19 NOTE — PATIENT DISCUSSION
7/19/21: LOWEST IOP MEASURED TODAY. PT CONCERNED.  REINFORCED SHE'S IN GREAT HANDS AND HE'LL TX AS NEEDED HER HYPOTONY OS. CONSIDER SX.

## 2021-08-27 NOTE — PATIENT DISCUSSION
STOPPED MTX SUMMER 2018 - ON PREDNISIONE - BP TOLD HER TO GO 4 MG - CONT 4 MG TILL SHE SEED DR Medina Haque

## 2021-08-27 NOTE — PATIENT DISCUSSION
DR Amalia Menjivar SUGGESTED PT TO GO TO [facility] FOR FURTHER MANAGEMENT OF IOP. DISCUSSED W PT I'LL TO TALK TO DR PINEDAEHWSHALOM.9:35 PM: SPOKE W DR Woods [address] PER HIS REPORT, IOP NEEDS TO BE CORRECTED BY 2 POINTS GIVEN PACHY. HE'LL CHECK HER NEXT WK AND TX AS NEEDED.

## 2021-08-27 NOTE — PATIENT DISCUSSION
8/27/21: WILL SEND HER AN ARTICLE AND CONSULT W OTHER PHYSICIANS ABOUT POSSIBLE TX OPTIONS. WE''LL REVIEW AND REASSESS.

## 2021-08-27 NOTE — PATIENT DISCUSSION
NO VASCULITIS SEEN - TO SEE DR Ruthie Henriquez IN 1-2 WEEKS FOR EVALUATION AND TO SEE IF SHE CAN STOP HER PREDNISONE - AND FOR HER ONGOING F/U AND CARE.

## 2021-11-29 NOTE — PATIENT DISCUSSION
STOPPED MTX SUMMER 2018 - ON PREDNISIONE - BP TOLD HER TO GO 4 MG - CONT 4 MG TILL SHE SEED DR Dawood Castro.

## 2021-11-29 NOTE — PATIENT DISCUSSION
DR Michael Rojo SUGGESTED PT TO GO TO [facility] FOR FURTHER MANAGEMENT OF IOP. DISCUSSED W PT I'LL TO TALK TO DR LOPEZ.1:16 PM: SPOKE W DR Balta [place]. PER HIS REPORT, IOP NEEDS TO BE CORRECTED BY 2 POINTS GIVEN PACHY. HE'LL CHECK HER NEXT WK AND TX AS NEEDED.

## 2021-11-29 NOTE — PATIENT DISCUSSION
8/26/20Vickie Apo SINCE 2018. Persistent THICKENING OF CHOROID, POSSIBLE RELATIONSHIP WITH HYPOTONY AFTER TRAB. CHRONICITY MAKES IMPROVEMENTE LESS LIKELY, BUT POSS. NO SIGNS OF SCLERITIS SEEN TODAY, HOWEVER GIVEN PERSISTENT THCIKENING, WILL DO STEROIDS TO EVAL IF ANY IMPROVEMENT IS SEEN.

## 2021-11-29 NOTE — PATIENT DISCUSSION
NO VASCULITIS SEEN - TO SEE DR Edwige España IN 1-2 WEEKS FOR EVALUATION AND TO SEE IF SHE CAN STOP HER PREDNISONE - AND FOR HER ONGOING F/U AND CARE.

## 2021-11-29 NOTE — PATIENT DISCUSSION
11/29/21: unchangeD. CONT MONITORING OF IOP. ELEVANTION OF SCLERA POSTERIORLY LIKELY FROM THICKENING.

## 2022-03-09 NOTE — PATIENT DISCUSSION
NO VASCULITIS SEEN - TO SEE DR Memo Mendes IN 1-2 WEEKS FOR EVALUATION AND TO SEE IF SHE CAN STOP HER PREDNISONE - AND FOR HER ONGOING F/U AND CARE.

## 2022-03-09 NOTE — PATIENT DISCUSSION
3/9/22: PROGRESSION OF CATARACT OD. RECOMMEND CHECKING A NEW RX. IF NO FURTHER IMPROVEMENT, CONSIDER CE TO REDUCE COMPLEXITIES OF SX. DISCUSSED THAT SHE MAY NEED A CL TO REDUCE ANISOMETROPIA IF SHE'D LIKE OBTIANING A FRAME.

## 2022-03-09 NOTE — PATIENT DISCUSSION
DR Mike Sandra SUGGESTED PT TO GO TO [facility] FOR FURTHER MANAGEMENT OF IOP. DISCUSSED W PT I'LL TO TALK TO DR DOSS.9:99 PM: SPOKE W DR Yoli Avila. PER HIS REPORT, IOP NEEDS TO BE CORRECTED BY 2 POINTS GIVEN PACHY. HE'LL CHECK HER NEXT WK AND TX AS NEEDED.

## 2022-03-09 NOTE — PATIENT DISCUSSION
STOPPED MTX SUMMER 2018 - ON PREDNISIONE - BP TOLD HER TO GO 4 MG - CONT 4 MG TILL SHE SEED DR Faisal Adams.

## 2022-03-09 NOTE — PATIENT DISCUSSION
8/26/20Celestino Olsen SINCE 2018. Persistent THICKENING OF CHOROID, POSSIBLE RELATIONSHIP WITH HYPOTONY AFTER TRAB. CHRONICITY MAKES IMPROVEMENTE LESS LIKELY, BUT POSS. NO SIGNS OF SCLERITIS SEEN TODAY, HOWEVER GIVEN PERSISTENT THCIKENING, WILL DO STEROIDS TO EVAL IF ANY IMPROVEMENT IS SEEN.

## 2022-07-12 NOTE — PATIENT DISCUSSION
NO VASCULITIS SEEN - TO SEE DR Bernardino To IN 1-2 WEEKS FOR EVALUATION AND TO SEE IF SHE CAN STOP HER PREDNISONE - AND FOR HER ONGOING F/U AND CARE.

## 2022-07-12 NOTE — PATIENT DISCUSSION
DR Patricia Montague SUGGESTED PT TO GO TO [facility] FOR FURTHER MANAGEMENT OF IOP. DISCUSSED W PT I'LL TO TALK TO DR LAIRD.6:50 PM: SPOKE W DR [place]. PER HIS REPORT, IOP NEEDS TO BE CORRECTED BY 2 POINTS GIVEN PACHY. HE'LL CHECK HER NEXT WK AND TX AS NEEDED.

## 2022-07-12 NOTE — PATIENT DISCUSSION
[facility] SIN[facility]. Persistent THICKENING OF CHOROID, POSSIBLE RELATIONSHIP WITH HYPOTONY AFTER TRAB. CHRONICITY MAKES IMPROVEMENTE LESS LIKELY, BUT POSS. NO SIGNS OF SCLERITIS SEEN TODAY, HOWEVER GIVEN PERSISTENT THCIKENING, WILL DO STEROIDS TO EVAL IF ANY IMPROVEMENT IS SEEN.

## 2022-11-14 NOTE — PATIENT DISCUSSION
NO VASCULITIS SEEN - TO SEE DR Lucita Rosas IN 1-2 WEEKS FOR EVALUATION AND TO SEE IF SHE CAN STOP HER PREDNISONE - AND FOR HER ONGOING F/U AND CARE.

## 2022-11-14 NOTE — PATIENT DISCUSSION
NEW 1 7 20 WITH CHOROIDAL THICKENING ON B SCAN. Detail Level: Simple Consent: The risks of pain and injection site reactions were reviewed with the patient prior to the injection. Cosentyx Amount: 300 mg Treatment Number (Optional): 1 Administered By (Optional): Dr Dempsey Lot # (Optional): SN [id number] Expiration Date (Optional):

## 2022-11-14 NOTE — PATIENT DISCUSSION
DR Stanley Corey SUGGESTED PT TO GO TO [facility] FOR FURTHER MANAGEMENT OF IOP. DISCUSSED W PT I'LL TO TALK TO DR LAUREN.3:75 PM: SPOKE W  [facility]. PER HIS REPORT, IOP NEEDS TO BE CORRECTED BY 2 POINTS GIVEN PACHY. HE'LL CHECK HER NEXT WK AND TX AS NEEDED.

## 2022-11-14 NOTE — PATIENT DISCUSSION
STOPPED MTX SUMMER 2018 - ON PREDNISIONE - BP TOLD HER TO GO 4 MG - CONT 4 MG TILL SHE SEED DR Dread Powell.

## 2022-11-14 NOTE — PATIENT DISCUSSION
8/26/20Armenta Apo SINCE 2018. Persistent THICKENING OF CHOROID, POSSIBLE RELATIONSHIP WITH HYPOTONY AFTER TRAB. CHRONICITY MAKES IMPROVEMENTE LESS LIKELY, BUT POSS. NO SIGNS OF SCLERITIS SEEN TODAY, HOWEVER GIVEN PERSISTENT THCIKENING, WILL DO STEROIDS TO EVAL IF ANY IMPROVEMENT IS SEEN.

## 2023-01-12 NOTE — PATIENT DISCUSSION
NO PROGRESSION ON OCT. Helical Rim Advancement Flap Text: The defect edges were debeveled with a #15 blade scalpel.  Given the location of the defect and the proximity to free margins (helical rim) a double helical rim advancement flap was deemed most appropriate.  Using a sterile surgical marker, the appropriate advancement flaps were drawn incorporating the defect and placing the expected incisions between the helical rim and antihelix where possible.  The area thus outlined was incised through and through with a #15 scalpel blade.  With a skin hook and iris scissors, the flaps were gently and sharply undermined and freed up.

## 2023-01-27 NOTE — PATIENT DISCUSSION
8 17 16 NOW OD AS WELL, RECOM STARTING DROPS OU. Last office visit 10/12/2022  Next OV: 2/13/2023. Last refill 7/11/2022.

## 2024-05-13 NOTE — PATIENT DISCUSSION
Health Maintenance       DTaP/Tdap/Td Vaccine (3 - Td or Tdap)  Overdue since 12/10/2021    COVID-19 Vaccine (4 - 2023-24 season)  Overdue since 9/1/2023    Depression Screening (Yearly)  Due soon on 8/30/2024           Following review of the above:  Patient is not proceeding with: COVID-19    Note: Refer to final orders and clinician documentation.       NO BOTHERING HER THAT MUCH.